# Patient Record
Sex: FEMALE | Race: WHITE | Employment: PART TIME | URBAN - METROPOLITAN AREA
[De-identification: names, ages, dates, MRNs, and addresses within clinical notes are randomized per-mention and may not be internally consistent; named-entity substitution may affect disease eponyms.]

---

## 2021-06-03 LAB
ABO, EXTERNAL RESULT: NORMAL
HEP B, EXTERNAL RESULT: NEGATIVE
HIV, EXTERNAL RESULT: NEGATIVE
RH FACTOR, EXTERNAL RESULT: NEGATIVE
RPR, EXTERNAL RESULT: NON REACTIVE
RUBELLA TITER, EXTERNAL RESULT: NORMAL

## 2021-06-22 LAB
C. TRACHOMATIS, EXTERNAL RESULT: NEGATIVE
N. GONORRHOEAE, EXTERNAL RESULT: NEGATIVE

## 2021-12-07 LAB — GBS, EXTERNAL RESULT: NEGATIVE

## 2021-12-30 ENCOUNTER — PREP FOR PROCEDURE (OUTPATIENT)
Dept: OBGYN | Age: 26
End: 2021-12-30

## 2021-12-30 ENCOUNTER — APPOINTMENT (OUTPATIENT)
Dept: LABOR AND DELIVERY | Age: 26
DRG: 560 | End: 2021-12-30
Payer: COMMERCIAL

## 2021-12-30 ENCOUNTER — HOSPITAL ENCOUNTER (INPATIENT)
Age: 26
LOS: 3 days | Discharge: HOME | DRG: 560 | End: 2022-01-02
Attending: OBSTETRICS & GYNECOLOGY | Admitting: OBSTETRICS & GYNECOLOGY
Payer: COMMERCIAL

## 2021-12-30 PROBLEM — O26.03 EXCESSIVE WEIGHT GAIN DURING PREGNANCY IN THIRD TRIMESTER: Status: ACTIVE | Noted: 2021-12-30

## 2021-12-30 PROBLEM — O40.3XX0 POLYHYDRAMNIOS IN THIRD TRIMESTER: Status: ACTIVE | Noted: 2021-12-30

## 2021-12-30 PROBLEM — O99.213 OBESITY AFFECTING PREGNANCY IN THIRD TRIMESTER: Status: ACTIVE | Noted: 2021-12-30

## 2021-12-30 LAB
ABO/RH: NORMAL
AMPHETAMINE SCREEN, URINE: NORMAL
ANTIBODY SCREEN: NORMAL
BARBITURATE SCREEN URINE: NORMAL
BASOPHILS ABSOLUTE: 0 K/UL (ref 0–0.2)
BASOPHILS RELATIVE PERCENT: 0.4 %
BENZODIAZEPINE SCREEN, URINE: NORMAL
BUPRENORPHINE URINE: NORMAL
CANNABINOID SCREEN URINE: NORMAL
COCAINE METABOLITE SCREEN URINE: NORMAL
EOSINOPHILS ABSOLUTE: 0.1 K/UL (ref 0–0.6)
EOSINOPHILS RELATIVE PERCENT: 0.6 %
HCT VFR BLD CALC: 39 % (ref 36–48)
HEMOGLOBIN: 13.5 G/DL (ref 12–16)
LYMPHOCYTES ABSOLUTE: 1.7 K/UL (ref 1–5.1)
LYMPHOCYTES RELATIVE PERCENT: 18.4 %
Lab: NORMAL
MCH RBC QN AUTO: 29.3 PG (ref 26–34)
MCHC RBC AUTO-ENTMCNC: 34.7 G/DL (ref 31–36)
MCV RBC AUTO: 84.4 FL (ref 80–100)
METHADONE SCREEN, URINE: NORMAL
MONOCYTES ABSOLUTE: 0.6 K/UL (ref 0–1.3)
MONOCYTES RELATIVE PERCENT: 6.8 %
NEUTROPHILS ABSOLUTE: 6.8 K/UL (ref 1.7–7.7)
NEUTROPHILS RELATIVE PERCENT: 73.8 %
OPIATE SCREEN URINE: NORMAL
OXYCODONE URINE: NORMAL
PDW BLD-RTO: 14 % (ref 12.4–15.4)
PH UA: 7
PHENCYCLIDINE SCREEN URINE: NORMAL
PLATELET # BLD: 207 K/UL (ref 135–450)
PMV BLD AUTO: 8.7 FL (ref 5–10.5)
PROPOXYPHENE SCREEN: NORMAL
RBC # BLD: 4.62 M/UL (ref 4–5.2)
WBC # BLD: 9.2 K/UL (ref 4–11)

## 2021-12-30 PROCEDURE — 86900 BLOOD TYPING SEROLOGIC ABO: CPT

## 2021-12-30 PROCEDURE — 86850 RBC ANTIBODY SCREEN: CPT

## 2021-12-30 PROCEDURE — 86780 TREPONEMA PALLIDUM: CPT

## 2021-12-30 PROCEDURE — 85025 COMPLETE CBC W/AUTO DIFF WBC: CPT

## 2021-12-30 PROCEDURE — 86901 BLOOD TYPING SEROLOGIC RH(D): CPT

## 2021-12-30 PROCEDURE — 80307 DRUG TEST PRSMV CHEM ANLYZR: CPT

## 2021-12-30 PROCEDURE — 6370000000 HC RX 637 (ALT 250 FOR IP): Performed by: OBSTETRICS & GYNECOLOGY

## 2021-12-30 PROCEDURE — 1220000000 HC SEMI PRIVATE OB R&B

## 2021-12-30 RX ORDER — SODIUM CHLORIDE, SODIUM LACTATE, POTASSIUM CHLORIDE, AND CALCIUM CHLORIDE .6; .31; .03; .02 G/100ML; G/100ML; G/100ML; G/100ML
500 INJECTION, SOLUTION INTRAVENOUS PRN
Status: DISCONTINUED | OUTPATIENT
Start: 2021-12-30 | End: 2022-01-01

## 2021-12-30 RX ORDER — ONDANSETRON 2 MG/ML
4 INJECTION INTRAMUSCULAR; INTRAVENOUS EVERY 6 HOURS PRN
Status: DISCONTINUED | OUTPATIENT
Start: 2021-12-30 | End: 2022-01-01

## 2021-12-30 RX ORDER — DOCUSATE SODIUM 100 MG/1
100 CAPSULE, LIQUID FILLED ORAL 2 TIMES DAILY
Status: DISCONTINUED | OUTPATIENT
Start: 2021-12-30 | End: 2022-01-01

## 2021-12-30 RX ORDER — SODIUM CHLORIDE, SODIUM LACTATE, POTASSIUM CHLORIDE, AND CALCIUM CHLORIDE .6; .31; .03; .02 G/100ML; G/100ML; G/100ML; G/100ML
1000 INJECTION, SOLUTION INTRAVENOUS PRN
Status: CANCELLED | OUTPATIENT
Start: 2021-12-30

## 2021-12-30 RX ORDER — SODIUM CHLORIDE 0.9 % (FLUSH) 0.9 %
5-40 SYRINGE (ML) INJECTION PRN
Status: DISCONTINUED | OUTPATIENT
Start: 2021-12-30 | End: 2022-01-01

## 2021-12-30 RX ORDER — SODIUM CHLORIDE 0.9 % (FLUSH) 0.9 %
5-40 SYRINGE (ML) INJECTION PRN
Status: CANCELLED | OUTPATIENT
Start: 2021-12-30

## 2021-12-30 RX ORDER — SODIUM CHLORIDE 0.9 % (FLUSH) 0.9 %
5-40 SYRINGE (ML) INJECTION EVERY 12 HOURS SCHEDULED
Status: DISCONTINUED | OUTPATIENT
Start: 2021-12-30 | End: 2022-01-01

## 2021-12-30 RX ORDER — SODIUM CHLORIDE, SODIUM LACTATE, POTASSIUM CHLORIDE, AND CALCIUM CHLORIDE .6; .31; .03; .02 G/100ML; G/100ML; G/100ML; G/100ML
500 INJECTION, SOLUTION INTRAVENOUS PRN
Status: CANCELLED | OUTPATIENT
Start: 2021-12-30

## 2021-12-30 RX ORDER — ONDANSETRON 2 MG/ML
4 INJECTION INTRAMUSCULAR; INTRAVENOUS EVERY 6 HOURS PRN
Status: CANCELLED | OUTPATIENT
Start: 2021-12-30

## 2021-12-30 RX ORDER — SODIUM CHLORIDE, SODIUM LACTATE, POTASSIUM CHLORIDE, AND CALCIUM CHLORIDE .6; .31; .03; .02 G/100ML; G/100ML; G/100ML; G/100ML
1000 INJECTION, SOLUTION INTRAVENOUS PRN
Status: DISCONTINUED | OUTPATIENT
Start: 2021-12-30 | End: 2022-01-01

## 2021-12-30 RX ORDER — SODIUM CHLORIDE 0.9 % (FLUSH) 0.9 %
5-40 SYRINGE (ML) INJECTION EVERY 12 HOURS SCHEDULED
Status: CANCELLED | OUTPATIENT
Start: 2021-12-30

## 2021-12-30 RX ORDER — SODIUM CHLORIDE 9 MG/ML
25 INJECTION, SOLUTION INTRAVENOUS PRN
Status: DISCONTINUED | OUTPATIENT
Start: 2021-12-30 | End: 2022-01-01

## 2021-12-30 RX ORDER — SODIUM CHLORIDE 9 MG/ML
25 INJECTION, SOLUTION INTRAVENOUS PRN
Status: CANCELLED | OUTPATIENT
Start: 2021-12-30

## 2021-12-30 RX ORDER — SODIUM CHLORIDE, SODIUM LACTATE, POTASSIUM CHLORIDE, CALCIUM CHLORIDE 600; 310; 30; 20 MG/100ML; MG/100ML; MG/100ML; MG/100ML
INJECTION, SOLUTION INTRAVENOUS CONTINUOUS
Status: CANCELLED | OUTPATIENT
Start: 2021-12-30

## 2021-12-30 RX ORDER — DOCUSATE SODIUM 100 MG/1
100 CAPSULE, LIQUID FILLED ORAL 2 TIMES DAILY
Status: CANCELLED | OUTPATIENT
Start: 2021-12-30

## 2021-12-30 RX ORDER — SODIUM CHLORIDE, SODIUM LACTATE, POTASSIUM CHLORIDE, CALCIUM CHLORIDE 600; 310; 30; 20 MG/100ML; MG/100ML; MG/100ML; MG/100ML
INJECTION, SOLUTION INTRAVENOUS CONTINUOUS
Status: DISCONTINUED | OUTPATIENT
Start: 2021-12-30 | End: 2022-01-01

## 2021-12-30 RX ADMIN — Medication 25 MCG: at 18:50

## 2021-12-30 RX ADMIN — Medication 25 MCG: at 22:58

## 2021-12-30 NOTE — PLAN OF CARE
Problem: Anxiety:  Goal: Level of anxiety will decrease  Description: Level of anxiety will decrease  Outcome: Ongoing     Problem: Breathing Pattern - Ineffective:  Goal: Able to breathe comfortably  Description: Able to breathe comfortably  Outcome: Ongoing     Problem: Fluid Volume - Imbalance:  Goal: Absence of imbalanced fluid volume signs and symptoms  Description: Absence of imbalanced fluid volume signs and symptoms  Outcome: Ongoing  Goal: Absence of intrapartum hemorrhage signs and symptoms  Description: Absence of intrapartum hemorrhage signs and symptoms  Outcome: Ongoing     Problem: Infection - Intrapartum Infection:  Goal: Will show no infection signs and symptoms  Description: Will show no infection signs and symptoms  Outcome: Ongoing     Problem: Labor Process - Prolonged:  Goal: Labor progression, first stage, within specified pattern  Description: Labor progression, first stage, within specified pattern  Outcome: Ongoing  Goal: Labor progession, second stage, within specified pattern  Description: Labor progession, second stage, within specified pattern  Outcome: Ongoing  Goal: Uterine contractions within specified parameters  Description: Uterine contractions within specified parameters  Outcome: Ongoing

## 2021-12-30 NOTE — H&P
Department of Obstetrics and Gynecology  Labor and Delivery Admit Note        CHIEF COMPLAINT:  Polyhydramnios, admit for IOL    HISTORY OF PRESENT ILLNESS:      The patient is a 27 y.o. female 39w1d. OB History        1    Para        Term                AB        Living           SAB        IAB        Ectopic        Molar        Multiple        Live Births                  Patient with floridalma 25cm on recent u/s, also with 52# wt gain in pregnancy who presents for IOL. She is obese. Poly noted on recent Level II u/s which was done for excessive maternal wt gain. Normal fetal growth. Intermittent ctx, no vb, no lof, +fm. Estimated Due Date:  Estimated Date of Delivery: 22    PAST MEDICAL HISTORY:   Past Medical History:   Diagnosis Date    Chronic kidney disease     uti's       PAST  SURGICAL HISTORY:   Past Surgical History:   Procedure Laterality Date    ANTERIOR CRUCIATE LIGAMENT REPAIR      left        SOCIAL HISTORY:     reports that she has never smoked. She has never used smokeless tobacco. She reports current drug use. Drug: Marijuana Ardia Coco). She reports that she does not drink alcohol. MEDICATIONS:    Prior to Admission medications    Medication Sig Start Date End Date Taking? Authorizing Provider   Prenatal MV-Min-Fe Fum-FA-DHA (PRENATAL 1 PO) Take by mouth   Yes Historical Provider, MD        PRENATAL CARE:    Complicated by: poly, obesity, wt gain    REVIEW OF SYSTEMS:     Pertinent items are noted in HPI.     PHYSICAL EXAM:    Vital Signs:   Vitals:    21 1730   BP: 131/72   Pulse: 113   Resp:    Temp:        /72   Pulse 113   Temp 98.3 °F (36.8 °C) (Oral)   Resp 18   Ht 5' 3.5\" (1.613 m)   Wt 201 lb (91.2 kg)   LMP 2021   BMI 35.05 kg/m²   General appearance: alert, appears stated age, cooperative and no distress  Head: Normocephalic, without obvious abnormality, atraumatic  Lungs: clear to auscultation bilaterally  Heart: regular rate and rhythm  Abdomen: obese, gravid, NT throughout  Neurologic: Grossly normal    Fetal heart rate:  Baseline Heart Rate 140, accelerations:  present  long term variability:  moderate  decelerations:  absent  Cervix:  1 cm 50% medium -2    Contraction frequency:  irreg    Membranes:  Intact    General Labs:      CBC:   Lab Results   Component Value Date    WBC 9.2 2021    RBC 4.62 2021    HGB 13.5 2021    HCT 39.0 2021    MCV 84.4 2021    MCH 29.3 2021    MCHC 34.7 2021    RDW 14.0 2021     2021    MPV 8.7 2021       ASSESSMENT/PLAN:       27 y.o. female 39w1d. OB History        1    Para        Term                AB        Living           SAB        IAB        Ectopic        Molar        Multiple        Live Births                  IOL for polyhydramnios. R/b/a d/w pt, including option of possible outpt f/u without IOL at this time, which she declines. Will place cytotec. GBS neg. Reassuring fetal status.     Corinne Salisbury, MD  2021    Addendum: H/o MJ use, not currently using

## 2021-12-30 NOTE — PROGRESS NOTES
Pt put on efm at this time.  Pt state she is feeling baby move, pt denies vaginal bleeding or leaking of amniotic fluid

## 2021-12-31 ENCOUNTER — ANESTHESIA EVENT (OUTPATIENT)
Dept: LABOR AND DELIVERY | Age: 26
DRG: 560 | End: 2021-12-31
Payer: COMMERCIAL

## 2021-12-31 ENCOUNTER — ANESTHESIA (OUTPATIENT)
Dept: LABOR AND DELIVERY | Age: 26
DRG: 560 | End: 2021-12-31
Payer: COMMERCIAL

## 2021-12-31 LAB — TOTAL SYPHILLIS IGG/IGM: NORMAL

## 2021-12-31 PROCEDURE — 6360000002 HC RX W HCPCS: Performed by: OBSTETRICS & GYNECOLOGY

## 2021-12-31 PROCEDURE — 1220000000 HC SEMI PRIVATE OB R&B

## 2021-12-31 PROCEDURE — 2580000003 HC RX 258: Performed by: OBSTETRICS & GYNECOLOGY

## 2021-12-31 PROCEDURE — 2500000003 HC RX 250 WO HCPCS: Performed by: NURSE ANESTHETIST, CERTIFIED REGISTERED

## 2021-12-31 PROCEDURE — 3700000025 EPIDURAL BLOCK: Performed by: ANESTHESIOLOGY

## 2021-12-31 PROCEDURE — 6370000000 HC RX 637 (ALT 250 FOR IP): Performed by: OBSTETRICS & GYNECOLOGY

## 2021-12-31 RX ORDER — PROMETHAZINE HYDROCHLORIDE 25 MG/ML
12.5 INJECTION, SOLUTION INTRAMUSCULAR; INTRAVENOUS ONCE
Status: DISCONTINUED | OUTPATIENT
Start: 2021-12-31 | End: 2021-12-31

## 2021-12-31 RX ORDER — EPHEDRINE SULFATE 50 MG/ML
INJECTION INTRAVENOUS
Status: COMPLETED
Start: 2021-12-31 | End: 2021-12-31

## 2021-12-31 RX ORDER — BUPIVACAINE HYDROCHLORIDE 5 MG/ML
INJECTION, SOLUTION EPIDURAL; INTRACAUDAL PRN
Status: DISCONTINUED | OUTPATIENT
Start: 2021-12-31 | End: 2022-01-01 | Stop reason: SDUPTHER

## 2021-12-31 RX ORDER — BUTORPHANOL TARTRATE 1 MG/ML
1 INJECTION, SOLUTION INTRAMUSCULAR; INTRAVENOUS ONCE
Status: COMPLETED | OUTPATIENT
Start: 2021-12-31 | End: 2021-12-31

## 2021-12-31 RX ORDER — BUPIVACAINE HYDROCHLORIDE 5 MG/ML
INJECTION, SOLUTION EPIDURAL; INTRACAUDAL
Status: COMPLETED
Start: 2021-12-31 | End: 2021-12-31

## 2021-12-31 RX ORDER — BUPIVACAINE HYDROCHLORIDE 2.5 MG/ML
INJECTION, SOLUTION EPIDURAL; INFILTRATION; INTRACAUDAL PRN
Status: DISCONTINUED | OUTPATIENT
Start: 2021-12-31 | End: 2022-01-01 | Stop reason: SDUPTHER

## 2021-12-31 RX ORDER — PROMETHAZINE HYDROCHLORIDE 25 MG/ML
12.5 INJECTION, SOLUTION INTRAMUSCULAR; INTRAVENOUS ONCE
Status: COMPLETED | OUTPATIENT
Start: 2021-12-31 | End: 2021-12-31

## 2021-12-31 RX ORDER — EPHEDRINE SULFATE 50 MG/ML
INJECTION INTRAVENOUS PRN
Status: DISCONTINUED | OUTPATIENT
Start: 2021-12-31 | End: 2022-01-01 | Stop reason: SDUPTHER

## 2021-12-31 RX ORDER — LIDOCAINE HYDROCHLORIDE 20 MG/ML
INJECTION, SOLUTION EPIDURAL; INFILTRATION; INTRACAUDAL; PERINEURAL PRN
Status: DISCONTINUED | OUTPATIENT
Start: 2021-12-31 | End: 2022-01-01 | Stop reason: SDUPTHER

## 2021-12-31 RX ORDER — BUPIVACAINE HYDROCHLORIDE 2.5 MG/ML
INJECTION, SOLUTION EPIDURAL; INFILTRATION; INTRACAUDAL
Status: COMPLETED
Start: 2021-12-31 | End: 2021-12-31

## 2021-12-31 RX ADMIN — Medication 15 ML/HR: at 12:46

## 2021-12-31 RX ADMIN — LIDOCAINE HYDROCHLORIDE 2 ML: 20 INJECTION, SOLUTION EPIDURAL; INFILTRATION; INTRACAUDAL; PERINEURAL at 20:58

## 2021-12-31 RX ADMIN — BUPIVACAINE HYDROCHLORIDE 1 ML: 2.5 INJECTION, SOLUTION EPIDURAL; INFILTRATION; INTRACAUDAL; PERINEURAL at 23:44

## 2021-12-31 RX ADMIN — EPHEDRINE SULFATE 25 MG: 50 INJECTION INTRAVENOUS at 12:46

## 2021-12-31 RX ADMIN — BUPIVACAINE HYDROCHLORIDE 3 ML: 2.5 INJECTION, SOLUTION EPIDURAL; INFILTRATION; INTRACAUDAL; PERINEURAL at 12:44

## 2021-12-31 RX ADMIN — BUPIVACAINE HYDROCHLORIDE 2 ML: 2.5 INJECTION, SOLUTION EPIDURAL; INFILTRATION; INTRACAUDAL; PERINEURAL at 20:58

## 2021-12-31 RX ADMIN — SODIUM CHLORIDE, POTASSIUM CHLORIDE, SODIUM LACTATE AND CALCIUM CHLORIDE: 600; 310; 30; 20 INJECTION, SOLUTION INTRAVENOUS at 01:32

## 2021-12-31 RX ADMIN — BUPIVACAINE HYDROCHLORIDE 3 ML: 5 INJECTION, SOLUTION EPIDURAL; INTRACAUDAL; PERINEURAL at 12:44

## 2021-12-31 RX ADMIN — LIDOCAINE HYDROCHLORIDE 2 ML: 20 INJECTION, SOLUTION EPIDURAL; INFILTRATION; INTRACAUDAL; PERINEURAL at 23:44

## 2021-12-31 RX ADMIN — SODIUM CHLORIDE, POTASSIUM CHLORIDE, SODIUM LACTATE AND CALCIUM CHLORIDE: 600; 310; 30; 20 INJECTION, SOLUTION INTRAVENOUS at 16:47

## 2021-12-31 RX ADMIN — BUPIVACAINE HYDROCHLORIDE 2 ML: 5 INJECTION, SOLUTION EPIDURAL; INTRACAUDAL; PERINEURAL at 20:58

## 2021-12-31 RX ADMIN — SODIUM CHLORIDE, POTASSIUM CHLORIDE, SODIUM LACTATE AND CALCIUM CHLORIDE: 600; 310; 30; 20 INJECTION, SOLUTION INTRAVENOUS at 04:49

## 2021-12-31 RX ADMIN — Medication 1 MILLI-UNITS/MIN: at 10:33

## 2021-12-31 RX ADMIN — BUPIVACAINE HYDROCHLORIDE 1 ML: 5 INJECTION, SOLUTION EPIDURAL; INTRACAUDAL; PERINEURAL at 23:44

## 2021-12-31 RX ADMIN — PROMETHAZINE HYDROCHLORIDE 12.5 MG: 25 INJECTION INTRAMUSCULAR; INTRAVENOUS at 09:40

## 2021-12-31 RX ADMIN — Medication 25 MCG: at 03:47

## 2021-12-31 RX ADMIN — SODIUM CHLORIDE, POTASSIUM CHLORIDE, SODIUM LACTATE AND CALCIUM CHLORIDE: 600; 310; 30; 20 INJECTION, SOLUTION INTRAVENOUS at 13:15

## 2021-12-31 RX ADMIN — SODIUM CHLORIDE, POTASSIUM CHLORIDE, SODIUM LACTATE AND CALCIUM CHLORIDE: 600; 310; 30; 20 INJECTION, SOLUTION INTRAVENOUS at 09:39

## 2021-12-31 RX ADMIN — BUTORPHANOL TARTRATE 1 MG: 1 INJECTION, SOLUTION INTRAMUSCULAR; INTRAVENOUS at 09:42

## 2021-12-31 ASSESSMENT — PAIN SCALES - GENERAL: PAINLEVEL_OUTOF10: 5

## 2021-12-31 NOTE — PROGRESS NOTES
POC reviewed with Dr. Yanet Aparicio. Can hold cytotec if patient is showing signs of active labor and is 3 cm dilated. Can place maximum of three doses. Provider will call RN around midnight.

## 2021-12-31 NOTE — ANESTHESIA PROCEDURE NOTES
Epidural Block    Patient location during procedure: OB  Start time: 12/31/2021 12:35 PM  End time: 12/31/2021 12:42 PM  Reason for block: labor epidural  Staffing  Performed: resident/CRNA   Anesthesiologist: Tono Higgins MD  Resident/CRNA: UTE Mallory CRNA  Preanesthetic Checklist  Completed: patient identified, IV checked, site marked, risks and benefits discussed, monitors and equipment checked, pre-op evaluation, timeout performed, anesthesia consent given, oxygen available and patient being monitored  Epidural  Patient position: sitting  Prep: Betadine  Patient monitoring: cardiac monitor, continuous pulse ox and frequent blood pressure checks  Approach: midline  Location: lumbar (1-5)  Injection technique: RICKY saline  Provider prep: mask and sterile gloves  Needle  Needle type: Tuohy   Needle gauge: 17 G  Needle length: 3.5 in  Catheter type: side hole  Catheter size: 19 G (20 G)  Test dose: negative (3 + 2 cc of 1.5 % xylocaine with epi)  Assessment  Sensory level: T8  Hemodynamics: unstable (IVF and IV And IM ephedrine)  Attempts: 1  Additional Notes  Pt. prepped and draped in sterile fashion. Skin wheal with 1% lidocaine. 17ga touhy needle to RICKY. 25 ga. Spinal needle per touhy. CSF visualized in hub. Needle withdrawn and catheter threaded. Negative test dose. Catheter secured with sterile dressing.

## 2021-12-31 NOTE — PROGRESS NOTES
MD Note     at 39w2d undergoing induction of labor for polyhydramnios. Patient is now comfortable s/p epidural.      Vitals:    21 1037 21 1139 21 1242 21 1243   BP: 122/81 135/62 (!) 80/43 (!) 79/44   Pulse: 75 79 82 94   Resp: 16      Temp:       TempSrc:       Weight:       Height:       alert, no distress  FHR 120s, + accels, one decel after epidural attributed to hypotension, resolved. Contractions q 2 minutes  Cervix: 4-3/63/ -3, cephalic. Amniotomy was performed, clear fluid and IUPC placed.     Plan: expectant management  - will start oxytocin if contractions space out  - anticipate     Melody Sanchez MD

## 2021-12-31 NOTE — PROGRESS NOTES
Dr. Malinda Rachel updated on pt status. Cytotec #2 placed around 2300. Pt starting to feel ctxs; palpating mild to moderate. Pattern is borderline tachy, started on MIVF and repositioned. No changes in POC at this time.

## 2021-12-31 NOTE — PROGRESS NOTES
Pt toco not accurately picking up due to maternal and fetal movement. Was tachysystole and IVF increased, repositioned on L side. Ctx spacing out. MD offered iv pain medication and patient agreeable at this time.

## 2021-12-31 NOTE — PROGRESS NOTES
Notified Dr. Tahir Wang of tachysystole unresolved by repositioning and pt previously had several fluid boluses. Pitocin was only at 1 so per policy, was turned off. Dr. Tahir Wang made aware. Pt desires epidural and plan is for Dr. Tahir Wang to assess afterwards.

## 2021-12-31 NOTE — PROGRESS NOTES
Call placed to Dr. Robert Morocho to update on SVE 1-2/70/-2, 4 hours after last dose of cytotec.  Instructed to start pitocin and MD will be to unit to re-evaluate later

## 2021-12-31 NOTE — PLAN OF CARE
Problem: Anxiety:  Goal: Level of anxiety will decrease  Description: Level of anxiety will decrease  12/31/2021 0622 by Leslie Felix RN  Outcome: Ongoing  12/30/2021 1843 by Noe Lott RN  Outcome: Ongoing     Problem: Breathing Pattern - Ineffective:  Goal: Able to breathe comfortably  Description: Able to breathe comfortably  12/31/2021 0622 by Leslie Felix RN  Outcome: Ongoing  12/30/2021 1843 by Noe Lott RN  Outcome: Ongoing     Problem: Fluid Volume - Imbalance:  Goal: Absence of imbalanced fluid volume signs and symptoms  Description: Absence of imbalanced fluid volume signs and symptoms  12/31/2021 0622 by Leslie Felix RN  Outcome: Ongoing  12/30/2021 1843 by Noe Lott RN  Outcome: Ongoing  Goal: Absence of intrapartum hemorrhage signs and symptoms  Description: Absence of intrapartum hemorrhage signs and symptoms  12/31/2021 0622 by Leslie Felix RN  Outcome: Ongoing  12/30/2021 1843 by Noe Lott RN  Outcome: Ongoing     Problem: Infection - Intrapartum Infection:  Goal: Will show no infection signs and symptoms  Description: Will show no infection signs and symptoms  12/31/2021 0622 by Leslie Felix RN  Outcome: Ongoing  12/30/2021 1843 by Noe Lott RN  Outcome: Ongoing     Problem: Labor Process - Prolonged:  Goal: Labor progression, first stage, within specified pattern  Description: Labor progression, first stage, within specified pattern  12/31/2021 0622 by Leslie Felix RN  Outcome: Ongoing  12/30/2021 1843 by Noe Lott RN  Outcome: Ongoing  Goal: Labor progession, second stage, within specified pattern  Description: Labor progession, second stage, within specified pattern  12/31/2021 0622 by Leslie Felix RN  Outcome: Ongoing  12/30/2021 1843 by Noe Lott RN  Outcome: Ongoing  Goal: Uterine contractions within specified parameters  Description: Uterine contractions within specified parameters  12/31/2021 0622 by Leslie Felix RN  Outcome: Ongoing  2021 1843 by Carrington Currie RN  Outcome: Ongoing     Problem:  Screening:  Goal: Ability to make informed decisions regarding treatment has improved  Description: Ability to make informed decisions regarding treatment has improved  2021 by Isi Encinas RN  Outcome: Ongoing  2021 1843 by Carrington Currie RN  Outcome: Ongoing     Problem: Pain - Acute:  Goal: Pain level will decrease  Description: Pain level will decrease  2021 by Isi Encinas RN  Outcome: Ongoing  2021 1843 by Carrington Currie RN  Outcome: Ongoing  Goal: Able to cope with pain  Description: Able to cope with pain  2021 by Isi Encinas RN  Outcome: Ongoing  2021 1843 by Carrington uCrrie RN  Outcome: Ongoing     Problem: Urinary Retention:  Goal: Experiences of bladder distention will decrease  Description: Experiences of bladder distention will decrease  2021 by Isi Encinas RN  Outcome: Ongoing  2021 1843 by Carrington Currie RN  Outcome: Ongoing  Goal: Urinary elimination within specified parameters  Description: Urinary elimination within specified parameters  2021 by Isi Encinas RN  Outcome: Ongoing  2021 1843 by Carrington Currie RN  Outcome: Ongoing     Problem: Pain:  Goal: Pain level will decrease  Description: Pain level will decrease  2021 by Isi Encinas RN  Outcome: Ongoing  2021 1843 by Carrington Currie RN  Outcome: Ongoing  Goal: Control of acute pain  Description: Control of acute pain  Outcome: Ongoing  Goal: Control of chronic pain  Description: Control of chronic pain  Outcome: Ongoing

## 2021-12-31 NOTE — ANESTHESIA PRE PROCEDURE
Department of Anesthesiology  Preprocedure Note       Name:  Ge Olvera   Age:  27 y. o.  :  1995                                          MRN:  [id number]         Date:  2021      Surgeon: * No surgeons listed *    Procedure: * No procedures listed *    Medications prior to admission:   Prior to Admission medications    Medication Sig Start Date End Date Taking?  Authorizing Provider   Prenatal MV-Min-Fe Fum-FA-DHA (PRENATAL 1 PO) Take by mouth   Yes Historical Provider, MD       Current medications:    Current Facility-Administered Medications   Medication Dose Route Frequency Provider Last Rate Last Admin    oxytocin (PITOCIN) 30 units in 500 mL infusion  1-20 ellyn-units/min IntraVENous Continuous Sumaya Quick MD   Stopped at 21 1148    0.9 % sodium chloride infusion  25 mL IntraVENous PRN Inell Ambrosia, DO        docusate sodium (COLACE) capsule 100 mg  100 mg Oral BID Inell Ambrosia, DO        lactated ringers bolus  500 mL IntraVENous PRN Inell Ambrosia, DO        Or    lactated ringers bolus  1,000 mL IntraVENous PRN Inell Ambrosia, DO        lactated ringers infusion   IntraVENous Continuous Inell Ambrosia,  mL/hr at 21 0939 New Bag at 21 0939    ondansetron (ZOFRAN) injection 4 mg  4 mg IntraVENous Q6H PRN Inell Ambrosia, DO        oxytocin (PITOCIN) 30 units in 500 mL infusion  87.3 ellyn-units/min IntraVENous Continuous PRN Inell Ambrosia, DO        And    oxytocin (PITOCIN) 10 unit bolus from the bag  10 Units IntraVENous PRN Inell Ambrosia, DO        sodium chloride flush 0.9 % injection 5-40 mL  5-40 mL IntraVENous 2 times per day Inell Ambrosia, DO        sodium chloride flush 0.9 % injection 5-40 mL  5-40 mL IntraVENous PRN Inell Ambrosia, DO         Facility-Administered Medications Ordered in Other Encounters   Medication Dose Route Frequency Provider Last Rate Last Admin    bupivacaine (PF) (MARCAINE) 0.25 % injection   Epidural PRN Carla Barefoot, APRN - CRNA   3 mL at 12/31/21 1244    bupivacaine (PF) (MARCAINE) 0.5 % injection   Epidural PRN Yair Yeimy, APRN - CRNA   3 mL at 12/31/21 1244    sodium chloride 0.9 % 200 mL with fentaNYL 500 mcg, bupivacaine 0.5% 50 mL (OB) epidural   Epidural Continuous PRN Yair Yeimy, APRN - CRNA 15 mL/hr at 12/31/21 1246 15 mL/hr at 12/31/21 1246    ePHEDrine injection   IntraVENous PRN Yair Yeimy, APRN - CRNA   25 mg at 12/31/21 1246    ePHEDrine injection   IntraMUSCular PRN Yair Yeimy, APRN - CRNA   25 mg at 12/31/21 1246       Allergies:  No Known Allergies    Problem List:    Patient Active Problem List   Diagnosis Code    Polyhydramnios in third trimester O40. 3XX0    Excessive weight gain during pregnancy in third trimester O26.03    Obesity affecting pregnancy in third trimester O99.213       Past Medical History:        Diagnosis Date    Chronic kidney disease     uti's       Past Surgical History:        Procedure Laterality Date    ANTERIOR CRUCIATE LIGAMENT REPAIR      left        Social History:    Social History     Tobacco Use    Smoking status: Never Smoker    Smokeless tobacco: Never Used   Substance Use Topics    Alcohol use: No                                Counseling given: Not Answered      Vital Signs (Current):   Vitals:    12/31/21 1037 12/31/21 1139 12/31/21 1242 12/31/21 1243   BP: 122/81 135/62 (!) 80/43 (!) 79/44   Pulse: 75 79 82 94   Resp: 16      Temp:       TempSrc:       Weight:       Height:                                                  BP Readings from Last 3 Encounters:   12/31/21 (!) 79/44       NPO Status:                                                                                 BMI:   Wt Readings from Last 3 Encounters:   12/30/21 201 lb (91.2 kg)     Body mass index is 35.05 kg/m².     CBC:   Lab Results   Component Value Date    WBC 9.2 12/30/2021    RBC 4.62 12/30/2021    HGB 13.5 12/30/2021    HCT 39.0 12/30/2021    MCV 84.4 12/30/2021    RDW 14.0 12/30/2021  12/30/2021       CMP: No results found for: NA, K, CL, CO2, BUN, CREATININE, GFRAA, AGRATIO, LABGLOM, GLUCOSE, GLU, PROT, CALCIUM, BILITOT, ALKPHOS, AST, ALT    POC Tests: No results for input(s): POCGLU, POCNA, POCK, POCCL, POCBUN, POCHEMO, POCHCT in the last 72 hours. Coags: No results found for: PROTIME, INR, APTT    HCG (If Applicable):   Lab Results   Component Value Date    PREGTESTUR negative 11/16/2015        ABGs: No results found for: PHART, PO2ART, CIQ9AHB, JZG9JDR, BEART, E2PXTUZQ     Type & Screen (If Applicable):  No results found for: LABABO, LABRH    Drug/Infectious Status (If Applicable):  No results found for: HIV, HEPCAB    COVID-19 Screening (If Applicable): No results found for: COVID19        Anesthesia Evaluation  Patient summary reviewed and Nursing notes reviewed no history of anesthetic complications:   Airway: Mallampati: II  TM distance: >3 FB   Neck ROM: full  Mouth opening: > = 3 FB Dental:          Pulmonary:Negative Pulmonary ROS                              Cardiovascular:Negative CV ROS                      Neuro/Psych:   Negative Neuro/Psych ROS              GI/Hepatic/Renal: Neg GI/Hepatic/Renal ROS            Endo/Other: Negative Endo/Other ROS                    Abdominal:             Vascular: negative vascular ROS. Other Findings:             Anesthesia Plan      epidural     ASA 2 - emergent             Anesthetic plan and risks discussed with patient. Plan discussed with attending. Alternatives to, benifits and risks of continuous lumbar epidural for labor (including, but not limited to, hypotension, spinal headache, inadequate sensory blockade) were discussed in detail with the patient. All questions were answered to her satisfaction.   The patient desires and agrees to proceed with continuous epidural.      UTE Wharton - CRNA   12/31/2021

## 2022-01-01 PROCEDURE — 10907ZC DRAINAGE OF AMNIOTIC FLUID, THERAPEUTIC FROM PRODUCTS OF CONCEPTION, VIA NATURAL OR ARTIFICIAL OPENING: ICD-10-PCS | Performed by: OBSTETRICS & GYNECOLOGY

## 2022-01-01 PROCEDURE — 51701 INSERT BLADDER CATHETER: CPT

## 2022-01-01 PROCEDURE — 1220000000 HC SEMI PRIVATE OB R&B

## 2022-01-01 PROCEDURE — 3E0P7VZ INTRODUCTION OF HORMONE INTO FEMALE REPRODUCTIVE, VIA NATURAL OR ARTIFICIAL OPENING: ICD-10-PCS | Performed by: OBSTETRICS & GYNECOLOGY

## 2022-01-01 PROCEDURE — 7200000001 HC VAGINAL DELIVERY

## 2022-01-01 PROCEDURE — 0HQ9XZZ REPAIR PERINEUM SKIN, EXTERNAL APPROACH: ICD-10-PCS | Performed by: OBSTETRICS & GYNECOLOGY

## 2022-01-01 PROCEDURE — 6370000000 HC RX 637 (ALT 250 FOR IP): Performed by: OBSTETRICS & GYNECOLOGY

## 2022-01-01 RX ORDER — METHYLERGONOVINE MALEATE 0.2 MG/ML
200 INJECTION INTRAVENOUS PRN
Status: DISCONTINUED | OUTPATIENT
Start: 2022-01-01 | End: 2022-01-02 | Stop reason: HOSPADM

## 2022-01-01 RX ORDER — FAMOTIDINE 20 MG/1
20 TABLET, FILM COATED ORAL 2 TIMES DAILY
Status: DISCONTINUED | OUTPATIENT
Start: 2022-01-01 | End: 2022-01-02 | Stop reason: HOSPADM

## 2022-01-01 RX ORDER — MISOPROSTOL 100 UG/1
800 TABLET ORAL PRN
Status: DISCONTINUED | OUTPATIENT
Start: 2022-01-01 | End: 2022-01-02 | Stop reason: HOSPADM

## 2022-01-01 RX ORDER — SODIUM CHLORIDE 9 MG/ML
25 INJECTION, SOLUTION INTRAVENOUS PRN
Status: DISCONTINUED | OUTPATIENT
Start: 2022-01-01 | End: 2022-01-02 | Stop reason: HOSPADM

## 2022-01-01 RX ORDER — LANOLIN 100 %
OINTMENT (GRAM) TOPICAL PRN
Status: DISCONTINUED | OUTPATIENT
Start: 2022-01-01 | End: 2022-01-02 | Stop reason: HOSPADM

## 2022-01-01 RX ORDER — DOCUSATE SODIUM 100 MG/1
100 CAPSULE, LIQUID FILLED ORAL 2 TIMES DAILY
Status: DISCONTINUED | OUTPATIENT
Start: 2022-01-01 | End: 2022-01-02 | Stop reason: HOSPADM

## 2022-01-01 RX ORDER — IBUPROFEN 800 MG/1
800 TABLET ORAL EVERY 8 HOURS
Status: DISCONTINUED | OUTPATIENT
Start: 2022-01-01 | End: 2022-01-02 | Stop reason: HOSPADM

## 2022-01-01 RX ORDER — SODIUM CHLORIDE 0.9 % (FLUSH) 0.9 %
5-40 SYRINGE (ML) INJECTION PRN
Status: DISCONTINUED | OUTPATIENT
Start: 2022-01-01 | End: 2022-01-02 | Stop reason: HOSPADM

## 2022-01-01 RX ORDER — SODIUM CHLORIDE, SODIUM LACTATE, POTASSIUM CHLORIDE, CALCIUM CHLORIDE 600; 310; 30; 20 MG/100ML; MG/100ML; MG/100ML; MG/100ML
INJECTION, SOLUTION INTRAVENOUS CONTINUOUS
Status: DISCONTINUED | OUTPATIENT
Start: 2022-01-01 | End: 2022-01-02 | Stop reason: HOSPADM

## 2022-01-01 RX ORDER — ACETAMINOPHEN 500 MG
1000 TABLET ORAL EVERY 6 HOURS
Status: DISCONTINUED | OUTPATIENT
Start: 2022-01-01 | End: 2022-01-02 | Stop reason: HOSPADM

## 2022-01-01 RX ORDER — IBUPROFEN 800 MG/1
800 TABLET ORAL EVERY 8 HOURS
Status: DISCONTINUED | OUTPATIENT
Start: 2022-01-02 | End: 2022-01-01

## 2022-01-01 RX ORDER — ONDANSETRON 8 MG/1
8 TABLET, ORALLY DISINTEGRATING ORAL EVERY 8 HOURS PRN
Status: DISCONTINUED | OUTPATIENT
Start: 2022-01-01 | End: 2022-01-02 | Stop reason: HOSPADM

## 2022-01-01 RX ORDER — SODIUM CHLORIDE 0.9 % (FLUSH) 0.9 %
5-40 SYRINGE (ML) INJECTION EVERY 12 HOURS SCHEDULED
Status: DISCONTINUED | OUTPATIENT
Start: 2022-01-01 | End: 2022-01-02 | Stop reason: HOSPADM

## 2022-01-01 RX ADMIN — Medication 166.7 ML: at 00:11

## 2022-01-01 RX ADMIN — MOXIFLOXACIN HYDROCHLORIDE 800 MG: 400 TABLET, FILM COATED ORAL at 01:30

## 2022-01-01 RX ADMIN — MOXIFLOXACIN HYDROCHLORIDE 800 MG: 400 TABLET, FILM COATED ORAL at 14:04

## 2022-01-01 ASSESSMENT — PAIN SCALES - GENERAL
PAINLEVEL_OUTOF10: 5
PAINLEVEL_OUTOF10: 3

## 2022-01-01 NOTE — PROGRESS NOTES
Dr Varghese Courser updated via telephone of SVE 9/100/+1 and pt feeling intense pressure. Pt requesting epidural redose. Dr Varghese Courser states she is en route to department. No orders received.

## 2022-01-01 NOTE — FLOWSHEET NOTE
Social work consult placed per protocol for +MJ early pregnancy. UDS is negative upon admission. No UDS collected on baby but cord toxicity pending.

## 2022-01-01 NOTE — PLAN OF CARE
Problem: Anxiety:  Goal: Level of anxiety will decrease  Description: Level of anxiety will decrease  1/1/2022 0444 by Diego Moreno RN  Outcome: Ongoing  12/31/2021 1920 by Diego Moreno RN  Outcome: Ongoing     Problem: Breathing Pattern - Ineffective:  Goal: Able to breathe comfortably  Description: Able to breathe comfortably  1/1/2022 0444 by Diego Moreno RN  Outcome: Ongoing  12/31/2021 1920 by Diego Moreno RN  Outcome: Ongoing     Problem: Fluid Volume - Imbalance:  Goal: Absence of imbalanced fluid volume signs and symptoms  Description: Absence of imbalanced fluid volume signs and symptoms  1/1/2022 0444 by Diego Moreno RN  Outcome: Ongoing  12/31/2021 1920 by Diego Moreno RN  Outcome: Ongoing  Goal: Absence of intrapartum hemorrhage signs and symptoms  Description: Absence of intrapartum hemorrhage signs and symptoms  1/1/2022 0444 by Diego Moreno RN  Outcome: Ongoing  12/31/2021 1920 by Diego Moreno RN  Outcome: Ongoing     Problem: Infection - Intrapartum Infection:  Goal: Will show no infection signs and symptoms  Description: Will show no infection signs and symptoms  1/1/2022 0444 by Diego Moreno RN  Outcome: Ongoing  12/31/2021 1920 by Diego Moreno RN  Outcome: Ongoing     Problem: Labor Process - Prolonged:  Goal: Labor progression, first stage, within specified pattern  Description: Labor progression, first stage, within specified pattern  1/1/2022 0444 by Diego Moerno RN  Outcome: Ongoing  12/31/2021 1920 by Diego Moreno RN  Outcome: Ongoing  Goal: Labor progession, second stage, within specified pattern  Description: Labor progession, second stage, within specified pattern  1/1/2022 0444 by Diego Moreno RN  Outcome: Ongoing  12/31/2021 1920 by Diego Moreno RN  Outcome: Ongoing  Goal: Uterine contractions within specified parameters  Description: Uterine contractions within specified parameters  1/1/2022 0444 by Diego Moreno RN  Outcome: Ongoing  12/31/2021 1920 by Diego Moreno RN  Outcome: Ongoing     Problem:  Screening:  Goal: Ability to make informed decisions regarding treatment has improved  Description: Ability to make informed decisions regarding treatment has improved  2022 0444 by Nikos Mcnair RN  Outcome: Ongoing  2021 192 by Nikos Mcnair RN  Outcome: Ongoing     Problem: Pain - Acute:  Goal: Pain level will decrease  Description: Pain level will decrease  2022 0444 by Nikos Mcnair RN  Outcome: Ongoing  2021 1920 by Nikos Mcnair RN  Outcome: Ongoing  Goal: Able to cope with pain  Description: Able to cope with pain  2022 0444 by Nikos Mcnair RN  Outcome: Ongoing  2021 1920 by Nikos Mcnair RN  Outcome: Ongoing     Problem: Tissue Perfusion - Uteroplacental, Altered:  Goal: Absence of abnormal fetal heart rate pattern  Description: Absence of abnormal fetal heart rate pattern  2022 0444 by Nikos Mcnair RN  Outcome: Ongoing  2021 1920 by Nikos Mcnair RN  Outcome: Ongoing     Problem: Urinary Retention:  Goal: Experiences of bladder distention will decrease  Description: Experiences of bladder distention will decrease  2022 0444 by Nikos Mcnair RN  Outcome: Ongoing  2021 1920 by Nikos Mcnair RN  Outcome: Ongoing  Goal: Urinary elimination within specified parameters  Description: Urinary elimination within specified parameters  2022 0444 by Nikos Mcnair RN  Outcome: Ongoing  2021 1920 by Nikos Mcnair RN  Outcome: Ongoing     Problem: Pain:  Goal: Pain level will decrease  Description: Pain level will decrease  2022 0444 by Nikos Mcnair RN  Outcome: Ongoing  2021 1920 by Nikos Mcnair RN  Outcome: Ongoing  Goal: Control of acute pain  Description: Control of acute pain  2022 0444 by Nikos Mcnair RN  Outcome: Ongoing  2021 1920 by Nikos Mcnair RN  Outcome: Ongoing  Goal: Control of chronic pain  Description: Control of chronic pain  2022 0444 by Nikos Mcnair RN  Outcome: Ongoing  2021 1920 by Nikos Mcnair RN  Outcome: Ongoing

## 2022-01-01 NOTE — PLAN OF CARE
Problem: Anxiety:  Goal: Level of anxiety will decrease  Description: Level of anxiety will decrease  12/31/2021 1920 by Lelia Ruiz RN  Outcome: Ongoing  12/31/2021 0622 by Saud Spivey RN  Outcome: Ongoing     Problem: Breathing Pattern - Ineffective:  Goal: Able to breathe comfortably  Description: Able to breathe comfortably  12/31/2021 1920 by Lelia Ruiz RN  Outcome: Ongoing  12/31/2021 0622 by Saud Spivey RN  Outcome: Ongoing     Problem: Fluid Volume - Imbalance:  Goal: Absence of imbalanced fluid volume signs and symptoms  Description: Absence of imbalanced fluid volume signs and symptoms  12/31/2021 1920 by Lelia Ruiz RN  Outcome: Ongoing  12/31/2021 0622 by Saud Spivey RN  Outcome: Ongoing  Goal: Absence of intrapartum hemorrhage signs and symptoms  Description: Absence of intrapartum hemorrhage signs and symptoms  12/31/2021 1920 by Lelia Ruiz RN  Outcome: Ongoing  12/31/2021 0622 by Saud Spivey RN  Outcome: Ongoing     Problem: Infection - Intrapartum Infection:  Goal: Will show no infection signs and symptoms  Description: Will show no infection signs and symptoms  12/31/2021 1920 by Lelia Ruiz RN  Outcome: Ongoing  12/31/2021 0622 by Saud Spivey RN  Outcome: Ongoing     Problem: Labor Process - Prolonged:  Goal: Labor progression, first stage, within specified pattern  Description: Labor progression, first stage, within specified pattern  12/31/2021 1920 by Lelia Ruiz RN  Outcome: Ongoing  12/31/2021 0622 by Saud Spivey RN  Outcome: Ongoing  Goal: Labor progession, second stage, within specified pattern  Description: Labor progession, second stage, within specified pattern  12/31/2021 1920 by Lelia Ruiz RN  Outcome: Ongoing  12/31/2021 0622 by Saud Spivey RN  Outcome: Ongoing  Goal: Uterine contractions within specified parameters  Description: Uterine contractions within specified parameters  12/31/2021 1920 by Lelia Ruiz RN  Outcome: Ongoing  12/31/2021 0622 by Saud Spivey RN  Outcome: Ongoing     Problem:  Screening:  Goal: Ability to make informed decisions regarding treatment has improved  Description: Ability to make informed decisions regarding treatment has improved  2021 by Manohar Flores RN  Outcome: Ongoing  2021 by Jovanny Cheema RN  Outcome: Ongoing     Problem: Pain - Acute:  Goal: Pain level will decrease  Description: Pain level will decrease  2021 by Manohar Flores RN  Outcome: Ongoing  2021 by Jovanny Cheema RN  Outcome: Ongoing  Goal: Able to cope with pain  Description: Able to cope with pain  2021 by Manohar Flores RN  Outcome: Ongoing  2021 by Jovanyn Cheema RN  Outcome: Ongoing     Problem: Tissue Perfusion - Uteroplacental, Altered:  Goal: Absence of abnormal fetal heart rate pattern  Description: Absence of abnormal fetal heart rate pattern  2021 by Manohar Flores RN  Outcome: Ongoing  2021 by Jovanny Cheema RN  Outcome: Ongoing     Problem: Urinary Retention:  Goal: Experiences of bladder distention will decrease  Description: Experiences of bladder distention will decrease  2021 by Manohar Flores RN  Outcome: Ongoing  2021 by Jovanny Cheema RN  Outcome: Ongoing  Goal: Urinary elimination within specified parameters  Description: Urinary elimination within specified parameters  2021 by Manohar Flores RN  Outcome: Ongoing  2021 by Jovanny Cheema RN  Outcome: Ongoing     Problem: Pain:  Goal: Pain level will decrease  Description: Pain level will decrease  2021 by Manohar Flores RN  Outcome: Ongoing  2021 by Jovanny Cheema RN  Outcome: Ongoing  Goal: Control of acute pain  Description: Control of acute pain  2021 by Manohar Flores RN  Outcome: Ongoing  2021 by Jovanny Cheema RN  Outcome: Ongoing  Goal: Control of chronic pain  Description: Control of chronic pain  2021 by Manohar Flores RN  Outcome: Ongoing  12/31/2021 0622 by Yohan Duque RN  Outcome: Ongoing

## 2022-01-01 NOTE — PLAN OF CARE
Problem: Anxiety:  Goal: Level of anxiety will decrease  Description: Level of anxiety will decrease  2022 by Agustin Lewis RN  Outcome: Completed  2022 by Agustin Lewis RN  Outcome: Ongoing     Problem: Breathing Pattern - Ineffective:  Goal: Able to breathe comfortably  Description: Able to breathe comfortably  2022 by Agustin Lewis RN  Outcome: Completed  2022 by Agustin Lewis RN  Outcome: Ongoing     Problem: Fluid Volume - Imbalance:  Goal: Absence of imbalanced fluid volume signs and symptoms  Description: Absence of imbalanced fluid volume signs and symptoms  2022 by Agustin Lewis RN  Outcome: Completed  2022 by Agustin Lewis RN  Outcome: Ongoing  Goal: Absence of intrapartum hemorrhage signs and symptoms  Description: Absence of intrapartum hemorrhage signs and symptoms  2022 by Agustin Lewis RN  Outcome: Completed  2022 by Agustin Lewis RN  Outcome: Ongoing     Problem:  Screening:  Goal: Ability to make informed decisions regarding treatment has improved  Description: Ability to make informed decisions regarding treatment has improved  2022 by Agustin Lewis RN  Outcome: Completed  2022 by Agustin Lewis RN  Outcome: Ongoing     Problem: Pain - Acute:  Goal: Pain level will decrease  Description: Pain level will decrease  2022 by Agustin Lewis RN  Outcome: Completed  2022 by Agustin Lewis RN  Outcome: Ongoing  Goal: Able to cope with pain  Description: Able to cope with pain  2022 by Agustin Lewis RN  Outcome: Completed  2022 by Agustin Lewis RN  Outcome: Ongoing

## 2022-01-01 NOTE — LACTATION NOTE
This note was copied from a baby's chart. Lactation Progress Note      Data:     Initial consult with lactation rounds. Primip breast feeder, who delivered overnight at 0007 at 39.2 weeks gestation by . MOB reports baby continues latching and breast feeding well with 1 void and 2 stools since birth. Action: Introduced self as  [address] on and offered much support. Breast feeding education provided including breast care, colostrum, when to expect mature milk supply, expected  feeding behaviors during the first 24-48 hours of life, and how to know infant is getting enough at the breast including daily feeding and output goals, and anticipatory weight trends. Encouraged much STS, offering the breast exclusively, when infant is first begining to wake and show hunger cues, and every 2-3 hours if baby is sleepy and without cues. Gave tips to wake sleepy baby as needed. Encouraged much STS and hand expression of colostrum when offering the breast. Reassured sleepy behavior is common on the first DOL as baby recovers from birth and reviewed what to expect with cluster feeding behaviors later this evening when infant is >24 hours old. Educated on normalcy of cluster feeding and importance to offer the breast on demand to bring in and establish a good milk supply. Reviewed importance of LATANYA to promote optimal milk transfer and prevention of soreness to nipples. Educated on how a good latch should look and feel, and encouraged to call for LC to assess latch as needed. Instructed how to break latch if ever shallow, pinching or painful and instructed that nipple should be rounded with release from the breast. Reviewed risks related to pacifiers, artificial nipples, and formula supplements when given without medical indication. Encouraged exclusive breast feeding unless medical indication were to arise. Name and number provided on whiteboard. Encouraged to call for  [facility] [place] to assess latch and for f/u support prn. Response: Verbalized understanding of teaching provided. Comfortable with breast feeding at this time. Will call for f/u support prn.

## 2022-01-02 VITALS
RESPIRATION RATE: 16 BRPM | HEART RATE: 95 BPM | TEMPERATURE: 98.4 F | OXYGEN SATURATION: 98 % | WEIGHT: 201 LBS | HEIGHT: 64 IN | SYSTOLIC BLOOD PRESSURE: 118 MMHG | DIASTOLIC BLOOD PRESSURE: 68 MMHG | BODY MASS INDEX: 34.31 KG/M2

## 2022-01-02 LAB
HCT VFR BLD CALC: 36.9 % (ref 36–48)
HEMOGLOBIN: 12.4 G/DL (ref 12–16)
MCH RBC QN AUTO: 29 PG (ref 26–34)
MCHC RBC AUTO-ENTMCNC: 33.6 G/DL (ref 31–36)
MCV RBC AUTO: 86.2 FL (ref 80–100)
PDW BLD-RTO: 13.9 % (ref 12.4–15.4)
PLATELET # BLD: 185 K/UL (ref 135–450)
PMV BLD AUTO: 9 FL (ref 5–10.5)
RBC # BLD: 4.28 M/UL (ref 4–5.2)
WBC # BLD: 12.5 K/UL (ref 4–11)

## 2022-01-02 PROCEDURE — 36415 COLL VENOUS BLD VENIPUNCTURE: CPT

## 2022-01-02 PROCEDURE — 85027 COMPLETE CBC AUTOMATED: CPT

## 2022-01-02 PROCEDURE — 6370000000 HC RX 637 (ALT 250 FOR IP): Performed by: OBSTETRICS & GYNECOLOGY

## 2022-01-02 RX ORDER — IBUPROFEN 800 MG/1
800 TABLET ORAL EVERY 8 HOURS
Qty: 60 TABLET | Refills: 0 | Status: SHIPPED | OUTPATIENT
Start: 2022-01-02

## 2022-01-02 RX ADMIN — ACETAMINOPHEN 1000 MG: 500 TABLET ORAL at 02:38

## 2022-01-02 RX ADMIN — ACETAMINOPHEN 1000 MG: 500 TABLET ORAL at 09:03

## 2022-01-02 RX ADMIN — MOXIFLOXACIN HYDROCHLORIDE 800 MG: 400 TABLET, FILM COATED ORAL at 02:39

## 2022-01-02 RX ADMIN — DOCUSATE SODIUM 100 MG: 100 CAPSULE ORAL at 09:03

## 2022-01-02 RX ADMIN — DOCUSATE SODIUM 100 MG: 100 CAPSULE ORAL at 02:38

## 2022-01-02 ASSESSMENT — PAIN SCALES - GENERAL
PAINLEVEL_OUTOF10: 2
PAINLEVEL_OUTOF10: 3

## 2022-01-02 NOTE — FLOWSHEET NOTE
Assume care. Report from [address] [place] [address]. Patient is sleeping with RR >10. [place]. Bathroom stocked.

## 2022-01-02 NOTE — LACTATION NOTE
This note was copied from a baby's chart. Lactation Progress Note      Data:   F/U with primip 1PP with breastfeeding and lactation rounds. MOB states that infant is bf well. Denies concerns at this time. Nipples are intact with minimal soreness noted. Feeding Method: Feeding Method Used: Breastfeeding  Urine output:  established   Stool output: established  Percent weight change from birth:  -3%      Action: Introduced self to patient as LC for the day. Name and number placed on whiteboard. BF education reviewed with patient and what to expect over then next 1-2 weeks with mature milk production, infant feedings, infant output, breast care, engorgement prevention, pacifier, bottle use, and pumping information. Discharge binder also reviewed with patient with f/u care and resources provided. All questions answered at this time. RN updated with education that was provided to patient. Patient instructed to call for f/u care as needed. Response: MOB verbalizes understanding of bf education that was provided. Feels confident with bf at time of consult and ready for discharge home. Will f/u as needed.

## 2022-01-02 NOTE — PLAN OF CARE
Problem: Discharge Planning:  Goal: Discharged to appropriate level of care  Description: Discharged to appropriate level of care  1/1/2022 2001 by Rosalba Martinez RN  Outcome: Ongoing  1/1/2022 1425 by Charlie Valenzuela RN  Outcome: Ongoing  1/1/2022 0743 by Queen Paulo RN  Outcome: Ongoing     Problem: Constipation:  Goal: Bowel elimination is within specified parameters  Description: Bowel elimination is within specified parameters  1/1/2022 2001 by Rosalba Martinez RN  Outcome: Ongoing  1/1/2022 1425 by Charlie Valenzuela RN  Outcome: Ongoing  1/1/2022 0743 by Queen Paulo RN  Outcome: Ongoing     Problem: Fluid Volume - Imbalance:  Goal: Absence of imbalanced fluid volume signs and symptoms  Description: Absence of imbalanced fluid volume signs and symptoms  1/1/2022 2001 by Rosalba Martinez RN  Outcome: Ongoing  1/1/2022 1425 by Charlie Valenzuela RN  Outcome: Ongoing  1/1/2022 0743 by Queen Paulo RN  Outcome: Ongoing  Goal: Absence of postpartum hemorrhage signs and symptoms  Description: Absence of postpartum hemorrhage signs and symptoms  1/1/2022 2001 by Rosalba Martinez RN  Outcome: Ongoing  1/1/2022 1425 by Charlie Valenzuela RN  Outcome: Ongoing  1/1/2022 0743 by Queen Paulo RN  Outcome: Ongoing     Problem: Infection - Risk of, Puerperal Infection:  Goal: Will show no infection signs and symptoms  Description: Will show no infection signs and symptoms  1/1/2022 2001 by Rosalba Martinez RN  Outcome: Ongoing  1/1/2022 1425 by Charlie Valenzuela RN  Outcome: Ongoing  1/1/2022 0743 by Queen Paulo RN  Outcome: Ongoing     Problem: Pain - Acute:  Goal: Pain level will decrease  Description: Pain level will decrease  1/1/2022 2001 by Rosalba Martinez RN  Outcome: Ongoing  1/1/2022 1425 by Charlie Valenzuela RN  Outcome: Ongoing  1/1/2022 0743 by Queen Paulo RN  Outcome: Ongoing     Problem: Mood - Altered:  Goal: Mood stable  Description: Mood stable  1/1/2022 2001 by Kathaleen Boas, RN  Outcome: Completed  1/1/2022 1425 by Shawanda Valenzuela RN  Outcome: Ongoing  1/1/2022 0743 by Kandy Raymond RN  Outcome: Ongoing

## 2022-01-02 NOTE — CARE COORDINATION
Social Work Consult/Assessment    Reason for Consult:MJ use in early pregnancy  Electronic record reviewed:yes   Delivery information:vaginal 1/1/21 0007  Marital Status:single   Mob's UDS on admission:negative   Infant's UDS/Cord tox:not collected/pending cord     Spoke with Mob today explained SW services. yes  Present in the room:MOB,FOB,baby  Living situation:lives with FOB in an apartment   Address and phone: [address]  Spouse or significant other:[facility]clain [facility] [phone]  Children:Kojo Melendez 1/1/21  Children's Protective Services involvement: denies  Support system:parents and boyfriend  Domestic Violence:denies  Mental Health:denies  Post Partum Depression:denies  Substance Abuse:denies. Uses MJ before she knew she was pregnant. Social Assistance Programs: [address] x [employer] [place]  Medicaid x  Supplies:has car seat and crib  [employer]s:states she already signed up for this     Summary:MOB plans to discharge today with baby and feels safe in her home environment. MOB has 12 weeks off work plan to return to a part time position and will arrange for  prior to this. SW will follow for pending cord and will notify CPS if needed.

## 2022-01-02 NOTE — ANESTHESIA POSTPROCEDURE EVALUATION
Department of Anesthesiology  Postprocedure Note    Patient: Nimo Perez  MRN: [id number]  YOB: 1995  Date of evaluation: 1/2/2022  Time:  12:02 PM     Procedure Summary     Date: 12/31/21 Room / Location:     Anesthesia Start: 1226 Anesthesia Stop: 01/01/22 0007    Procedure: Labor Analgesia Diagnosis:     Scheduled Providers:  Responsible Provider: Mary Beth Porter MD    Anesthesia Type: epidural ASA Status: 2 - Emergent          Anesthesia Type: epidural    Tova Phase I: Tova Score: 9    Tova Phase II: Tova Score: 10    Last vitals: Reviewed and per EMR flowsheets.        Anesthesia Post Evaluation    Patient location during evaluation: bedside  Patient participation: complete - patient participated  Level of consciousness: awake and alert  Nausea & Vomiting: no nausea and no vomiting  Complications: no  Cardiovascular status: hemodynamically stable  Hydration status: stable
Statement Selected

## 2022-01-02 NOTE — FLOWSHEET NOTE
Discharge Phone Call Log  Patient Name: Alex Machado Care Provider: Chelsea Reyes MD Discharge Date: 2022    Disposition of baby:    Phone Number: [phone] (home)     Attempts to Contact:  Date:    Nurse  Date:    Nurse  Date:    Nurse    1. Now that you are at home is your pain being well controlled? Y/N   What pain reducing measures are you using? ____________________________________        Information for the patient's :  Catalina Gomez [[id number]]   Delivery Method: Vaginal, Spontaneous     2. Are you currently  having any infant feeding issues? Y/N _____________________________ If yes, please explain: __________________________________________________________________  3. If breastfeeding, were you satisfied with the breastfeeding support services offered? Y/N  4.  Have you had to supplement? Y/N If yes, please explain: _____________________________________________________       Did you supplement while in the hospital, or begin formula supplementation at home?________________________________________  5. Did your OB provider offer you information about the benefits of breastfeeding during your prenatal visits? Y/N  6.  Have you made or have you already had your first appointment with the baby's doctor? Y/N If no, do you know when to schedule it? Y/N   7.  Have you scheduled your follow-up appointment? Y/N  If no, do you know when to schedule it? Y/N  8. Did staff discuss safe sleep during your stay? Y/N  Did you see the wall cling posted in your room explaining how to keep you and your baby safe? Y/N  10. Did your nurses and physicians include you in the plan of care, communicating with you respectfully? Y/N If no, please explain __________________________  11. Is there anyone in particular you would like to mention who provided care for you? ________________________________  12. Did your discharge occur in a timely manner?   Y/N If no, please explain __________________________  13. Do you have any other questions or concerns I can address today?  Y/N  __________________________________________________      Teaching During interview :_____________________________________________  ___________________________RN       Date:______________Time:________________

## 2022-01-02 NOTE — DISCHARGE SUMMARY
Obstetric Discharge Summary    Admitting Diagnosis  IUP 39.3 weeks  OB History        1    Para   1    Term   1            AB        Living   1       SAB        IAB        Ectopic        Molar        Multiple   0    Live Births   1                Reasons for Admission on 2021  5:08 PM  Postpartum care following vaginal delivery [Z39.2]  Induction of Labor    Prenatal Procedures  None    Intrapartum Procedures                 Spontaneous Vaginal Delivery: See Labor and Delivery Summary       Postpartum Procedures  None    Postpartum/Operative Complications       [place] Data  Information for the patient's :  Idania Baby Boy Tonny Umanzor [[id number]]   male   Birth Weight: 6 lb 15.5 oz (3.16 kg)     Discharge With Mother  Complications: No    Discharge Diagnosis       Discharge Information  Current Discharge Medication List      START taking these medications    Details   ibuprofen (ADVIL;MOTRIN) 800 MG tablet Take 1 tablet by mouth every 8 hours  Qty: 60 tablet, Refills: 0         CONTINUE these medications which have NOT CHANGED    Details   Prenatal MV-Min-Fe Fum-FA-DHA (PRENATAL 1 PO) Take by mouth           OBGYN Attending Progress Note  PPD#1 s/p     S: No complaints, rochelle po, pain controlled by meds, + ambulation, lochia less than menses, voiding spontaneously without difficutly      O: /84   Pulse 90   Temp 97.9 °F (36.6 °C) (Oral)   Resp 16   Ht 5' 3.5\" (1.613 m)   Wt 201 lb (91.2 kg)   LMP 2021   SpO2 98%   Breastfeeding Unknown   BMI 35.05 kg/m²   Abd - soft, fundus firm below umbilicus  Ext warm well perfused    WBC/Hgb/Hct/Plts:  12.5/12.4/36.9/185 ( 0650)    A/P: PPD#1 s/p    1. Recovering well  2. Meeting postpartum milestones. 3. Cont routine pp care while in hosp  4. Pelvic rest x 6wk  5. Rx for Ibuprofen provided prn pain  6. Desires early discharge home today. Discharge to: Home  Follow up in 4 weeks at [id number] [facility].   Condition stable    Discharge Date: 1/2/22 Time: 1230      Comments

## 2023-12-29 ENCOUNTER — OFFICE VISIT (OUTPATIENT)
Dept: ORTHOPEDIC SURGERY | Age: 28
End: 2023-12-29

## 2023-12-29 VITALS — BODY MASS INDEX: 34.31 KG/M2 | WEIGHT: 201 LBS | HEIGHT: 64 IN

## 2023-12-29 DIAGNOSIS — M25.532 LEFT WRIST PAIN: Primary | ICD-10-CM

## 2024-02-22 ENCOUNTER — TELEPHONE (OUTPATIENT)
Dept: ORTHOPEDIC SURGERY | Age: 29
End: 2024-02-22